# Patient Record
Sex: FEMALE | Race: WHITE | Employment: UNEMPLOYED | URBAN - METROPOLITAN AREA
[De-identification: names, ages, dates, MRNs, and addresses within clinical notes are randomized per-mention and may not be internally consistent; named-entity substitution may affect disease eponyms.]

---

## 2024-07-17 ENCOUNTER — OFFICE VISIT (OUTPATIENT)
Age: 39
End: 2024-07-17

## 2024-07-17 VITALS
DIASTOLIC BLOOD PRESSURE: 70 MMHG | HEART RATE: 86 BPM | TEMPERATURE: 99.3 F | BODY MASS INDEX: 21.52 KG/M2 | WEIGHT: 129.2 LBS | SYSTOLIC BLOOD PRESSURE: 113 MMHG | OXYGEN SATURATION: 99 % | HEIGHT: 65 IN

## 2024-07-17 DIAGNOSIS — B96.89 ACUTE BACTERIAL PHARYNGITIS: Primary | ICD-10-CM

## 2024-07-17 DIAGNOSIS — J02.8 ACUTE BACTERIAL PHARYNGITIS: Primary | ICD-10-CM

## 2024-07-17 LAB
STREP PYOGENES DNA, POC: NEGATIVE
VALID INTERNAL CONTROL, POC: NORMAL

## 2024-07-17 RX ORDER — AMOXICILLIN 500 MG/1
500 CAPSULE ORAL 2 TIMES DAILY
Qty: 14 CAPSULE | Refills: 0 | Status: SHIPPED | OUTPATIENT
Start: 2024-07-17 | End: 2024-07-24

## 2024-07-17 ASSESSMENT — ENCOUNTER SYMPTOMS: SORE THROAT: 1

## 2024-07-17 NOTE — PATIENT INSTRUCTIONS
Thank you for visiting [facility].    -Change toothbrush after 24 hour of antibiotic use**  -Avoid kissing or sharing beverages/utensils until all symptoms resolve    Bacterial Infection:  Antibiotic:  Take as prescribed on full stomach until you complete entire course and with a probiotic  Pain/fever/chills/body aches:  Tylenol every 4 hours OR Ibuprofen every 6 hours as needed  Sore Throat:  Lozenges, as needed.  Cepacol lozenges will help numb the throat  Chloraseptic spray also helps to numb throat pain  Salt water gargles to soothe throat pain with 1/2-1 tsp of Benadryl  Ice, popsicles, cold food to soothe throat    Please follow-up with primary care provider within 2-5 days if signs and symptoms have not resolved or worsened.    Please go immediately to the Emergency Department if you develop difficulty swallowing/breathing, respiratory distress, hoarse voice, drooling, difficulty opening jaw, stiff or swollen neck, shortness of breath, or uncontrollable fever/nausea/vomiting.

## 2024-07-17 NOTE — PROGRESS NOTES
Subjective     Chief Complaint   Patient presents with    Pharyngitis     Sore throat, worse at night, daughter testes positive for strep        Patient is 39 year old female presenting with sore throat for two days.  Denies fever or chills. Daughter diagnosed with strep throat.       Pharyngitis  Associated symptoms: sore throat    Associated symptoms: no fever        Past Medical History:   Diagnosis Date    Patient denies medical problems        Past Surgical History:   Procedure Laterality Date     SECTION         Family History   Problem Relation Age of Onset    Diabetes Type 1  Sister     Alzheimer's Disease Maternal Grandmother     Diabetes Maternal Grandfather     Liver Cancer Paternal Grandfather        No Known Allergies    Social History     Tobacco Use    Smoking status: Never    Smokeless tobacco: Never   Substance Use Topics    Alcohol use: Yes     Alcohol/week: 4.0 standard drinks of alcohol     Types: 4 Standard drinks or equivalent per week    Drug use: Never       Vitals:    24 1644   BP: 113/70   Pulse: 86   Temp: 99.3 °F (37.4 °C)   SpO2: 99%       Review of Systems   Constitutional:  Negative for chills and fever.   HENT:  Positive for sore throat.        Objective     Physical Exam  Vitals and nursing note reviewed.   Constitutional:       General: She is not in acute distress.     Appearance: Normal appearance. She is not ill-appearing.   HENT:      Head: Normocephalic and atraumatic.      Mouth/Throat:      Mouth: Mucous membranes are moist.      Pharynx: Oropharyngeal exudate and posterior oropharyngeal erythema present.   Cardiovascular:      Rate and Rhythm: Normal rate.      Pulses: Normal pulses.   Pulmonary:      Effort: Pulmonary effort is normal.   Skin:     General: Skin is warm and dry.   Neurological:      Mental Status: She is alert and oriented to person, place, and time.         Assessment & Plan     Diagnoses and all orders for this visit:  Acute bacterial

## 2024-07-20 LAB — S PYO THROAT QL CULT: NEGATIVE

## 2024-10-01 ENCOUNTER — HOSPITAL ENCOUNTER (OUTPATIENT)
Facility: HOSPITAL | Age: 39
Discharge: HOME | End: 2024-10-04
Attending: FAMILY MEDICINE
Payer: COMMERCIAL

## 2024-10-01 VITALS — HEIGHT: 65 IN | WEIGHT: 128 LBS | BODY MASS INDEX: 21.33 KG/M2

## 2024-10-01 DIAGNOSIS — Z12.39 ENCOUNTER FOR SCREENING FOR MALIGNANT NEOPLASM OF BREAST, UNSPECIFIED SCREENING MODALITY: ICD-10-CM

## 2024-10-01 PROCEDURE — 77067 SCR MAMMO BI INCL CAD: CPT

## 2024-10-05 ENCOUNTER — OFFICE VISIT (OUTPATIENT)
Age: 39
End: 2024-10-05

## 2024-10-05 VITALS
BODY MASS INDEX: 21.49 KG/M2 | HEART RATE: 61 BPM | TEMPERATURE: 98.4 F | SYSTOLIC BLOOD PRESSURE: 100 MMHG | DIASTOLIC BLOOD PRESSURE: 63 MMHG | HEIGHT: 65 IN | RESPIRATION RATE: 18 BRPM | OXYGEN SATURATION: 98 % | WEIGHT: 129 LBS

## 2024-10-05 DIAGNOSIS — J02.9 PHARYNGITIS, UNSPECIFIED ETIOLOGY: Primary | ICD-10-CM

## 2024-10-05 LAB
STREP PYOGENES DNA, POC: NEGATIVE
VALID INTERNAL CONTROL, POC: NORMAL

## 2024-10-05 RX ORDER — LIDOCAINE HYDROCHLORIDE 20 MG/ML
15 SOLUTION OROPHARYNGEAL
Qty: 100 ML | Refills: 0 | Status: SHIPPED | OUTPATIENT
Start: 2024-10-05 | End: 2024-10-10

## 2024-10-05 ASSESSMENT — ENCOUNTER SYMPTOMS
SORE THROAT: 1
DIARRHEA: 1

## 2024-10-05 NOTE — PATIENT INSTRUCTIONS
Thank you for visiting [facility] today.    -Tylenol/Ibuprofen for pain/fever  -Throat lozenges or throat sprays may help with discomfort  -Salt water gargles with 1/2 teaspoon to 1 teaspoon of Benadryl  -Soft, cold foods may soothe your throat as well as ice chips  -Increase humidity in house  -Viscous lidocaine gargles, if prescribed  -Follow up with ENT if no improvement    If you begin to have worsening pain, uncontrollable fever greater than 100.4 or difficulty swallowing, please go to the ER.

## 2024-10-05 NOTE — PROGRESS NOTES
Subjective     Chief Complaint   Patient presents with    Pharyngitis     Sore throat since yesterday, headache        Patient is 39 year old female presenting with sore throat that began yesterday.  She reports increased pain overnight and she took an amoxicillin this morning around 0300.   She denies fever or chills.  She denies appetite changes.  She has not taken anything else for symptoms.  Her children at home are also sick with similar symptoms.  She also reports diarrhea for the past few days.           Past Medical History:   Diagnosis Date    Patient denies medical problems        Past Surgical History:   Procedure Laterality Date     SECTION         Family History   Problem Relation Age of Onset    Diabetes Type 1  Sister     Alzheimer's Disease Maternal Grandmother     Diabetes Maternal Grandfather     Liver Cancer Paternal Grandfather     Breast Cancer Paternal Aunt 60       No Known Allergies    Social History     Tobacco Use    Smoking status: Never    Smokeless tobacco: Never   Substance Use Topics    Alcohol use: Yes     Alcohol/week: 4.0 standard drinks of alcohol     Types: 4 Standard drinks or equivalent per week    Drug use: Never       Vitals:    10/05/24 0835   BP: 100/63   Pulse: 61   Resp: 18   Temp: 98.4 °F (36.9 °C)   SpO2: 98%       Review of Systems   Constitutional:  Negative for chills and fever.   HENT:  Positive for sore throat.    Gastrointestinal:  Positive for diarrhea.       Objective     Physical Exam  Vitals and nursing note reviewed.   Constitutional:       General: She is not in acute distress.     Appearance: Normal appearance. She is not ill-appearing.   HENT:      Head: Normocephalic and atraumatic.      Mouth/Throat:      Mouth: Mucous membranes are moist.      Pharynx: Uvula midline. Posterior oropharyngeal erythema present. No oropharyngeal exudate.      Tonsils: No tonsillar exudate.   Cardiovascular:      Rate and Rhythm: Normal rate.      Pulses: Normal

## 2025-02-06 ENCOUNTER — OFFICE VISIT (OUTPATIENT)
Age: 40
End: 2025-02-06

## 2025-02-06 VITALS
RESPIRATION RATE: 16 BRPM | TEMPERATURE: 98.5 F | BODY MASS INDEX: 21.3 KG/M2 | WEIGHT: 128 LBS | DIASTOLIC BLOOD PRESSURE: 58 MMHG | OXYGEN SATURATION: 98 % | SYSTOLIC BLOOD PRESSURE: 94 MMHG | HEART RATE: 68 BPM

## 2025-02-06 DIAGNOSIS — J22 LOWER RESPIRATORY INFECTION (E.G., BRONCHITIS, PNEUMONIA, PNEUMONITIS, PULMONITIS): Primary | ICD-10-CM

## 2025-02-06 RX ORDER — ALBUTEROL SULFATE 90 UG/1
INHALANT RESPIRATORY (INHALATION)
Qty: 18 G | Refills: 0 | Status: SHIPPED | OUTPATIENT
Start: 2025-02-06

## 2025-02-06 RX ORDER — DEXTROMETHORPHAN HYDROBROMIDE AND PROMETHAZINE HYDROCHLORIDE 15; 6.25 MG/5ML; MG/5ML
5 SYRUP ORAL
Qty: 180 ML | Refills: 0 | Status: SHIPPED | OUTPATIENT
Start: 2025-02-06 | End: 2025-03-14

## 2025-02-06 RX ORDER — BENZONATATE 200 MG/1
200 CAPSULE ORAL 3 TIMES DAILY PRN
Qty: 30 CAPSULE | Refills: 0 | Status: SHIPPED | OUTPATIENT
Start: 2025-02-06 | End: 2025-02-16

## 2025-02-06 RX ORDER — AZITHROMYCIN 250 MG/1
TABLET, FILM COATED ORAL
Qty: 6 TABLET | Refills: 0 | Status: SHIPPED | OUTPATIENT
Start: 2025-02-06

## 2025-02-06 NOTE — PROGRESS NOTES
Shanika Jasmine (:  1985) is a 39 y.o. female,Established patient, here for evaluation of the following chief complaint(s):  Shortness of Breath (Pt presents to clinic w/ c/o SOB. Pt reports sx duration of 1 week, states worsening and of 25, states her cough became less productive and more tight. Pt reports use of OTC Mucinex and ibuprofen.//Pt reports there was flu in her household and believes she had it as well.)        SUBJECTIVE/OBJECTIVE:    History provided by:  Patient  Shortness of Breath       39 y.o. female presents with symptoms of cough, chest congestion, shortness of breath.  She states her whole house has flu and she started getting sick about a week ago, she admits to nasal congestion a little bit of sore throat, but now the cough and chest congestion is worse and she is having some shortness of breath and feeling that her chest is tight.  Cough is not very productive.  She is taking ibuprofen and Mucinex with little relief.  No history of asthma or COPD.  She denies chest pain.         Vitals:    25 1053   BP: (!) 94/58   Site: Right Upper Arm   Position: Sitting   Cuff Size: Medium Adult   Pulse: 68   Resp: 16   Temp: 98.5 °F (36.9 °C)   TempSrc: Oral   SpO2: 98%   Weight: 58.1 kg (128 lb)       No results found for this visit on 25.     Physical Exam  Constitutional:       General: She is not in acute distress.     Appearance: Normal appearance. She is ill-appearing. She is not toxic-appearing.   HENT:      Head: Normocephalic and atraumatic.      Right Ear: Tympanic membrane, ear canal and external ear normal.      Left Ear: Tympanic membrane, ear canal and external ear normal.      Nose: Congestion present.      Mouth/Throat:      Mouth: Mucous membranes are moist.      Pharynx: No oropharyngeal exudate or posterior oropharyngeal erythema.   Eyes:      General:         Right eye: No discharge.         Left eye: No discharge.      Conjunctiva/sclera: Conjunctivae normal.

## 2025-02-06 NOTE — PATIENT INSTRUCTIONS
Lower respiratory infection -  Check a chest x-ray to rule out pneumonia, please take prescription to one of our sister locations  Albuterol, 1-2 puffs every 4-6 hours as needed for shortness of breath  Azithromycin, take as directed  Tessalon for cough, every 8 hours as needed  Promethazine cough syrup, nightly at bedtime as needed  Continue over-the-counter guaifenesin  Drink plenty of fluids  Recommend follow-up with your primary care provider in the next 2 to 3 days  If any worsening go to the nearest emergency room  Call or return to clinic if any concerns

## 2025-07-02 ENCOUNTER — OFFICE VISIT (OUTPATIENT)
Age: 40
End: 2025-07-02

## 2025-07-02 VITALS
TEMPERATURE: 98.5 F | RESPIRATION RATE: 16 BRPM | SYSTOLIC BLOOD PRESSURE: 100 MMHG | HEART RATE: 71 BPM | BODY MASS INDEX: 21.2 KG/M2 | DIASTOLIC BLOOD PRESSURE: 58 MMHG | WEIGHT: 127.4 LBS | OXYGEN SATURATION: 97 %

## 2025-07-02 DIAGNOSIS — J02.9 PHARYNGITIS, UNSPECIFIED ETIOLOGY: Primary | ICD-10-CM

## 2025-07-02 LAB — S PYO AG THROAT QL: NORMAL

## 2025-07-02 RX ORDER — AMOXICILLIN 875 MG/1
875 TABLET, COATED ORAL 2 TIMES DAILY
Qty: 20 TABLET | Refills: 0 | Status: SHIPPED | OUTPATIENT
Start: 2025-07-02 | End: 2025-07-12

## 2025-07-02 ASSESSMENT — ENCOUNTER SYMPTOMS
NAUSEA: 0
DIARRHEA: 0
SINUS PAIN: 0
SORE THROAT: 1
SHORTNESS OF BREATH: 0
VOICE CHANGE: 0
VOMITING: 0
COUGH: 0
RHINORRHEA: 0
WHEEZING: 0
TROUBLE SWALLOWING: 0
ABDOMINAL PAIN: 0
SINUS PRESSURE: 0

## 2025-07-02 NOTE — PROGRESS NOTES
2025   Shanika Jasmine (: 1985) is a 40 y.o. female, New patient, here for evaluation of the following chief complaint(s):  Pharyngitis (Pt presents to clinic w/ C/o sore throat, bilat ear pain. Reports sx onset of 2025. Denies OTC usage. Reports child was diagnosed w/ strep.)     ASSESSMENT/PLAN:  Below is the assessment and plan developed based on review of pertinent history, physical exam, labs, studies, and medications.       Assessment & Plan  Pharyngitis, unspecified etiology       Orders:    POCT rapid strep A    amoxicillin (AMOXIL) 875 MG tablet; Take 1 tablet by mouth 2 times daily for 10 days      Handout given with care instructions  OTC for symptom management. Increase fluid intake, ensure adequate nutritional intake.  Follow up with PCP as needed.  Go to ED with development of any acute symptoms.     Follow up:  No follow-ups on file.  Follow up immediately for any new, worsening or changes or if symptoms are not improving over the next 5-7 days.     SUBJECTIVE/OBJECTIVE:  Son has strep. Patient has sore throat and ear pain since yesterday.      History provided by:  Patient   used: No    Pharyngitis  Severity:  Mild  Onset quality:  Sudden  Duration:  1 day  Timing:  Constant  Progression:  Unchanged  Chronicity:  New  Associated symptoms: ear pain and sore throat    Associated symptoms: no abdominal pain, no chest pain, no congestion, no cough, no diarrhea, no fatigue, no fever, no headaches, no myalgias, no nausea, no rash, no rhinorrhea, no shortness of breath, no vomiting and no wheezing           Pharyngitis (Pt presents to clinic w/ C/o sore throat, bilat ear pain. Reports sx onset of 2025. Denies OTC usage. Reports child was diagnosed w/ strep.)      No results found for any visits on 25.    No results found for this visit on 25.  XR Results (most recent):  @BSHSILASTIMGCAT([id number])@         Review of Systems   Constitutional:  Negative

## 2025-07-14 ENCOUNTER — HOSPITAL ENCOUNTER (OUTPATIENT)
Facility: HOSPITAL | Age: 40
Discharge: HOME | End: 2025-07-17
Attending: RADIOLOGY
Payer: MEDICARE

## 2025-07-14 ENCOUNTER — HOSPITAL ENCOUNTER (OUTPATIENT)
Facility: HOSPITAL | Age: 40
Discharge: HOME | End: 2025-07-17
Attending: FAMILY MEDICINE
Payer: MEDICARE

## 2025-07-14 DIAGNOSIS — N63.31 LUMP OF AXILLARY TAIL OF RIGHT BREAST: ICD-10-CM

## 2025-07-14 DIAGNOSIS — R22.30 AXILLARY MASS, UNSPECIFIED LATERALITY: ICD-10-CM

## 2025-07-14 PROCEDURE — 71260 CT THORAX DX C+: CPT

## 2025-07-14 PROCEDURE — 6360000004 HC RX CONTRAST MEDICATION: Performed by: FAMILY MEDICINE

## 2025-07-14 PROCEDURE — G0279 TOMOSYNTHESIS, MAMMO: HCPCS

## 2025-07-14 PROCEDURE — 76882 US LMTD JT/FCL EVL NVASC XTR: CPT

## 2025-07-14 RX ORDER — IOPAMIDOL 755 MG/ML
100 INJECTION, SOLUTION INTRAVASCULAR
Status: COMPLETED | OUTPATIENT
Start: 2025-07-14 | End: 2025-07-14

## 2025-07-14 RX ADMIN — IOPAMIDOL 70 ML: 755 INJECTION, SOLUTION INTRAVENOUS at 07:42
